# Patient Record
Sex: FEMALE | Race: OTHER | HISPANIC OR LATINO | Employment: UNEMPLOYED | URBAN - METROPOLITAN AREA
[De-identification: names, ages, dates, MRNs, and addresses within clinical notes are randomized per-mention and may not be internally consistent; named-entity substitution may affect disease eponyms.]

---

## 2022-09-03 ENCOUNTER — HOSPITAL ENCOUNTER (EMERGENCY)
Facility: HOSPITAL | Age: 22
Discharge: HOME | End: 2022-09-03
Attending: EMERGENCY MEDICINE

## 2022-09-03 VITALS
TEMPERATURE: 98 F | DIASTOLIC BLOOD PRESSURE: 71 MMHG | HEIGHT: 62 IN | OXYGEN SATURATION: 99 % | SYSTOLIC BLOOD PRESSURE: 116 MMHG | RESPIRATION RATE: 16 BRPM | BODY MASS INDEX: 30 KG/M2 | WEIGHT: 163 LBS | HEART RATE: 67 BPM

## 2022-09-03 DIAGNOSIS — N30.01 ACUTE CYSTITIS WITH HEMATURIA: Primary | ICD-10-CM

## 2022-09-03 DIAGNOSIS — U07.1 COVID-19: ICD-10-CM

## 2022-09-03 LAB
ALBUMIN SERPL BCP-MCNC: 3.8 G/DL (ref 3.5–5.2)
ALP SERPL-CCNC: 58 U/L (ref 55–135)
ALT SERPL W/O P-5'-P-CCNC: 13 U/L (ref 10–44)
ANION GAP SERPL CALC-SCNC: 11 MMOL/L (ref 8–16)
AST SERPL-CCNC: 22 U/L (ref 10–40)
B-HCG UR QL: NEGATIVE
BACTERIA #/AREA URNS HPF: ABNORMAL /HPF
BILIRUB SERPL-MCNC: 0.2 MG/DL (ref 0.1–1)
BILIRUB UR QL STRIP: NEGATIVE
BUN SERPL-MCNC: 10 MG/DL (ref 6–20)
CALCIUM SERPL-MCNC: 8.7 MG/DL (ref 8.7–10.5)
CAOX CRY URNS QL MICRO: ABNORMAL
CHLORIDE SERPL-SCNC: 109 MMOL/L (ref 95–110)
CLARITY UR: CLEAR
CO2 SERPL-SCNC: 19 MMOL/L (ref 23–29)
COLOR UR: ABNORMAL
CREAT SERPL-MCNC: 0.8 MG/DL (ref 0.5–1.4)
CTP QC/QA: YES
CTP QC/QA: YES
ERYTHROCYTE [DISTWIDTH] IN BLOOD BY AUTOMATED COUNT: 13.1 % (ref 11.5–14.5)
EST. GFR  (NO RACE VARIABLE): >60 ML/MIN/1.73 M^2
GLUCOSE SERPL-MCNC: 91 MG/DL (ref 70–110)
GLUCOSE UR QL STRIP: NEGATIVE
HCT VFR BLD AUTO: 38.9 % (ref 37–48.5)
HGB BLD-MCNC: 13 G/DL (ref 12–16)
HGB UR QL STRIP: ABNORMAL
HYALINE CASTS #/AREA URNS LPF: 0 /LPF
KETONES UR QL STRIP: NEGATIVE
LEUKOCYTE ESTERASE UR QL STRIP: ABNORMAL
MCH RBC QN AUTO: 28 PG (ref 27–31)
MCHC RBC AUTO-ENTMCNC: 33.4 G/DL (ref 32–36)
MCV RBC AUTO: 84 FL (ref 82–98)
MICROSCOPIC COMMENT: ABNORMAL
NITRITE UR QL STRIP: NEGATIVE
PH UR STRIP: 6 [PH] (ref 5–8)
PLATELET # BLD AUTO: 295 K/UL (ref 150–450)
PMV BLD AUTO: 9.4 FL (ref 9.2–12.9)
POTASSIUM SERPL-SCNC: 4.5 MMOL/L (ref 3.5–5.1)
PROT SERPL-MCNC: 7.3 G/DL (ref 6–8.4)
PROT UR QL STRIP: ABNORMAL
RBC # BLD AUTO: 4.64 M/UL (ref 4–5.4)
RBC #/AREA URNS HPF: >100 /HPF (ref 0–4)
SARS-COV-2 RDRP RESP QL NAA+PROBE: POSITIVE
SODIUM SERPL-SCNC: 139 MMOL/L (ref 136–145)
SP GR UR STRIP: >1.03 (ref 1–1.03)
SQUAMOUS #/AREA URNS HPF: 4 /HPF
URN SPEC COLLECT METH UR: ABNORMAL
UROBILINOGEN UR STRIP-ACNC: NEGATIVE EU/DL
WBC # BLD AUTO: 4.92 K/UL (ref 3.9–12.7)
WBC #/AREA URNS HPF: 12 /HPF (ref 0–5)

## 2022-09-03 PROCEDURE — 87086 URINE CULTURE/COLONY COUNT: CPT | Performed by: EMERGENCY MEDICINE

## 2022-09-03 PROCEDURE — 80053 COMPREHEN METABOLIC PANEL: CPT | Performed by: EMERGENCY MEDICINE

## 2022-09-03 PROCEDURE — U0002 COVID-19 LAB TEST NON-CDC: HCPCS | Performed by: EMERGENCY MEDICINE

## 2022-09-03 PROCEDURE — 85027 COMPLETE CBC AUTOMATED: CPT | Performed by: EMERGENCY MEDICINE

## 2022-09-03 PROCEDURE — 99283 EMERGENCY DEPT VISIT LOW MDM: CPT | Mod: 25

## 2022-09-03 PROCEDURE — 81000 URINALYSIS NONAUTO W/SCOPE: CPT | Performed by: EMERGENCY MEDICINE

## 2022-09-03 PROCEDURE — 81025 URINE PREGNANCY TEST: CPT | Performed by: EMERGENCY MEDICINE

## 2022-09-03 RX ORDER — CEPHALEXIN 500 MG/1
500 CAPSULE ORAL 4 TIMES DAILY
Qty: 20 CAPSULE | Refills: 0 | Status: SHIPPED | OUTPATIENT
Start: 2022-09-03 | End: 2022-09-08

## 2022-09-03 NOTE — ED PROVIDER NOTES
Encounter Date: 9/3/2022    SCRIBE #1 NOTE: I, Gregoria Chaney, am scribing for, and in the presence of,  Dr. Aldridge.     History     Chief Complaint   Patient presents with    Fatigue     Feeling weak and lightheaded x2 days. Fever 2 days ago for one day. Pt. Also had an episode of sob 2 days ago that resolved, none now. Denies cough. Denies gi/urinary symptoms.      Nilser Duarte Reyes is a 21 y.o. female presenting with feeling weak. Patient states that two days ago she began feeling weak and lightheaded. These symptoms then improved until this morning when she began experiencing them again. Patient denies any other symptoms at this time. Patient states that she is not vaccinated for COVID-19.        The history is provided by the patient. A  was used.   Review of patient's allergies indicates:  No Known Allergies  History reviewed. No pertinent past medical history.  History reviewed. No pertinent surgical history.  History reviewed. No pertinent family history.     Review of Systems   Constitutional:  Negative for fever.   HENT:  Negative for sore throat.    Respiratory:  Negative for shortness of breath.    Cardiovascular:  Negative for chest pain.   Gastrointestinal:  Negative for nausea.   Genitourinary:  Negative for dysuria.   Musculoskeletal:  Negative for back pain.   Skin:  Negative for rash.   Neurological:  Positive for weakness and light-headedness.   Hematological:  Does not bruise/bleed easily.     Physical Exam     Initial Vitals [09/03/22 0945]   BP Pulse Resp Temp SpO2   117/73 72 16 99.1 °F (37.3 °C) 99 %      MAP       --         Physical Exam    Constitutional: She appears well-developed and well-nourished. No distress.   HENT:   Head: Normocephalic and atraumatic.   Eyes: Pupils are equal, round, and reactive to light. No scleral icterus.   Neck: No JVD present.   Normal range of motion.  Musculoskeletal:         General: No edema. Normal range of motion.      Cervical  back: Normal range of motion.     Neurological: She is alert and oriented to person, place, and time.   Skin: No rash noted.   Psychiatric: She has a normal mood and affect. Thought content normal.       ED Course   Procedures  Labs Reviewed   COMPREHENSIVE METABOLIC PANEL - Abnormal; Notable for the following components:       Result Value    CO2 19 (*)     All other components within normal limits   URINALYSIS, REFLEX TO URINE CULTURE - Abnormal; Notable for the following components:    Color, UA Orange (*)     Specific Gravity, UA >1.030 (*)     Protein, UA 1+ (*)     Occult Blood UA 3+ (*)     Leukocytes, UA Trace (*)     All other components within normal limits    Narrative:     Specimen Source->Urine   URINALYSIS MICROSCOPIC - Abnormal; Notable for the following components:    RBC, UA >100 (*)     WBC, UA 12 (*)     All other components within normal limits    Narrative:     Specimen Source->Urine   SARS-COV-2 RDRP GENE - Abnormal; Notable for the following components:    POC Rapid COVID Positive (*)     All other components within normal limits   CULTURE, URINE   CBC WITHOUT DIFFERENTIAL   POCT URINE PREGNANCY          Imaging Results    None          Medications - No data to display           Scribe Attestation:   Scribe #1: I performed the above scribed service and the documentation accurately describes the services I performed. I attest to the accuracy of the note.               Clinical Impression:   Final diagnoses:  [N30.01] Acute cystitis with hematuria (Primary)  [U07.1] COVID-19      ED Disposition Condition    Discharge Stable          ED Prescriptions       Medication Sig Dispense Start Date End Date Auth. Provider    cephALEXin (KEFLEX) 500 MG capsule Take 1 capsule (500 mg total) by mouth 4 (four) times daily. for 5 days 20 capsule 9/3/2022 9/8/2022 Marta Aldridge MD          Follow-up Information       Follow up With Specialties Details Why Contact Info Additional Information    [facility]  Family Medicine Family Medicine Schedule an appointment as soon as possible for a visit  As needed, [facility] Referral Line (Tel: [phone]) [address], [address]  [facility] [address] [phone] Please park in [address] or D and use [address]. Take Medical Office Bldg. elevators.        I, Marta Aldridge, personally performed the services described in this documentation. All medical record entries made by the scribe were at my direction and in my presence.  I have reviewed the chart and agree that the record reflects my personal performance and is accurate and complete. Marta Aldridge M.D. 11:13 AM09/03/2022      Marta Aldridge MD  09/03/22 1113

## 2022-09-03 NOTE — ED PROVIDER NOTES
"Encounter Date: 9/3/2022       History     Chief Complaint   Patient presents with    Fatigue     Feeling weak and lightheaded x2 days. Fever 2 days ago for one day. Pt. Also had an episode of sob 2 days ago that resolved, none now. Denies cough. Denies gi/urinary symptoms.      HPI  Review of patient's allergies indicates:  No Known Allergies  No past medical history on file.  No past surgical history on file.  No family history on file.     Review of Systems    Physical Exam     Initial Vitals [09/03/22 0945]   BP Pulse Resp Temp SpO2   117/73 72 16 99.1 °F (37.3 °C) 99 %      MAP       --         Physical Exam    ED Course   Procedures  Labs Reviewed - No data to display       Imaging Results    None          Medications - No data to display                       Clinical Impression:    ***Please document a Clinical Impression and click the "Refresh" button to refresh your note and automatically pull in before signing.***         "

## 2022-09-03 NOTE — ED NOTES
Present to ED with fatigue for 3 days with difficulty breathing. Also have back pain since having IUD placed 4 years ago. Denies SOB at this time. Denies abd pain. Able to speak in full sentence. Denies vaginal bleeding. No distress noted

## 2022-09-04 LAB
BACTERIA UR CULT: NORMAL
BACTERIA UR CULT: NORMAL